# Patient Record
Sex: FEMALE | Race: WHITE
[De-identification: names, ages, dates, MRNs, and addresses within clinical notes are randomized per-mention and may not be internally consistent; named-entity substitution may affect disease eponyms.]

---

## 2020-01-23 ENCOUNTER — HOSPITAL ENCOUNTER (OUTPATIENT)
Dept: HOSPITAL 52 - LL.SDS | Age: 65
Discharge: HOME | End: 2020-01-23
Attending: SURGERY
Payer: MEDICARE

## 2020-01-23 VITALS — SYSTOLIC BLOOD PRESSURE: 157 MMHG | DIASTOLIC BLOOD PRESSURE: 83 MMHG | HEART RATE: 79 BPM

## 2020-01-23 DIAGNOSIS — E78.5: ICD-10-CM

## 2020-01-23 DIAGNOSIS — Z79.84: ICD-10-CM

## 2020-01-23 DIAGNOSIS — K21.9: ICD-10-CM

## 2020-01-23 DIAGNOSIS — Z99.89: ICD-10-CM

## 2020-01-23 DIAGNOSIS — J35.8: Primary | ICD-10-CM

## 2020-01-23 DIAGNOSIS — Z98.890: ICD-10-CM

## 2020-01-23 DIAGNOSIS — F32.9: ICD-10-CM

## 2020-01-23 DIAGNOSIS — F41.9: ICD-10-CM

## 2020-01-23 DIAGNOSIS — Z79.899: ICD-10-CM

## 2020-01-23 DIAGNOSIS — Z88.8: ICD-10-CM

## 2020-01-23 DIAGNOSIS — G47.33: ICD-10-CM

## 2020-01-23 DIAGNOSIS — I10: ICD-10-CM

## 2020-01-23 DIAGNOSIS — E83.42: ICD-10-CM

## 2020-01-23 DIAGNOSIS — E11.9: ICD-10-CM

## 2020-01-23 DIAGNOSIS — I48.91: ICD-10-CM

## 2020-01-23 PROCEDURE — 42826 REMOVAL OF TONSILS: CPT

## 2020-01-23 PROCEDURE — 82962 GLUCOSE BLOOD TEST: CPT

## 2020-01-23 PROCEDURE — 00170 ANES INTRAORAL PX NOS: CPT

## 2020-01-23 NOTE — PCM.HPR
H & P Addendum review





- H & P Addendum Review


Date of Original H & P: 01/21/20


Date Reviewed: 01/23/20


Time Reviewed: 10:22


Patient was Examined: No Changes

## 2020-01-23 NOTE — PCM.OPNOTE
- General Post-Op/Procedure Note


Date of Surgery/Procedure: 01/23/20


Operative Procedure(s): L Tonsillectomy


Findings: 





L Tonsil cyst


Pre Op Diagnosis: L tonsil cyst


Post-Op Diagnosis: Same


Anesthesia Technique: General ET Tube


Primary Surgeon: Thomas J Mohs


Anesthesia Provider: Tiesha VALERO in mLs: 2


Complications: None


Condition: Good

## 2020-01-24 NOTE — OR
Date of Procedure:  01/23/2020

 

PREOPERATIVE DIAGNOSIS:  Left tonsil cyst.

 

POSTOPERATIVE DIAGNOSIS:  Left tonsil cyst.

 

PROCEDURE PERFORMED:  Left tonsillectomy.

 

ANESTHESIA:  General.

 

PROCEDURE IN DETAIL:  Patient was brought to the operating room where general

endotracheal anesthesia was administered.  The oral gag retractor was inserted

and her jaw was tight, which made it difficult to open the retractor and get

good exposure.  I was able to open her mouth enough to be able to see the

hypopharynx and left tonsil cyst at the base of the tonsil.  The left tonsil was

grasped and retracted medially.  Electrocautery was used to dissect along its

muscular plane, removing the tonsil and the cyst without difficulty.  Some

minimal oozing occurred that was easily controlled with electrocautery.  The

right tonsil was normal and was not removed.  The surgical site was inspected

and remained hemostatic after the retractor was released.  The retractor was

then removed and patient extubated and returned to recovery in stable condition.

 

ESTIMATED BLOOD LOSS:  2 mL.

 

MODL THOMAS J MOHS, MD

DD:  01/23/2020 11:20:59

DT:  01/23/2020 15:15:19

Job #:  [id number]

## 2021-05-13 ENCOUNTER — HOSPITAL ENCOUNTER (OUTPATIENT)
Dept: HOSPITAL 52 - LL.SDS | Age: 66
Discharge: HOME | End: 2021-05-13
Attending: SURGERY
Payer: MEDICARE

## 2021-05-13 VITALS — SYSTOLIC BLOOD PRESSURE: 140 MMHG | HEART RATE: 71 BPM | DIASTOLIC BLOOD PRESSURE: 70 MMHG

## 2021-05-13 DIAGNOSIS — Z20.822: ICD-10-CM

## 2021-05-13 DIAGNOSIS — K60.1: Primary | ICD-10-CM

## 2021-05-13 DIAGNOSIS — R92.8: ICD-10-CM

## 2021-05-13 DIAGNOSIS — I10: ICD-10-CM

## 2021-05-13 DIAGNOSIS — I47.1: ICD-10-CM

## 2021-05-13 DIAGNOSIS — N39.0: ICD-10-CM

## 2021-05-13 DIAGNOSIS — E66.01: ICD-10-CM

## 2021-05-13 DIAGNOSIS — E11.9: ICD-10-CM

## 2021-05-13 PROCEDURE — U0002 COVID-19 LAB TEST NON-CDC: HCPCS

## 2021-05-13 NOTE — PCM.HPR
H & P Addendum review





- H & P Addendum Review


Date of Original H & P: 04/30/21


Date Reviewed: 05/13/21


Time Reviewed: 08:59


Patient was Examined: No Changes

## 2021-05-13 NOTE — PCM.OPNOTE
- General Post-Op/Procedure Note


Date of Surgery/Procedure: 05/13/21


Operative Procedure(s): Colonoscopy


Findings: 





Chronic Anal Fissure


Pre Op Diagnosis: Screening


Post-Op Diagnosis: Same


Primary Surgeon: Thomas J Mohs


Anesthesia Provider: Tiesha VALERO in mLs: 0


Complications: None


Condition: Good

## 2021-05-14 NOTE — OR
Date of Procedure:  05/13/2021

 

PREOPERATIVE DIAGNOSIS:  Colon screening.

 

POSTOPERATIVE DIAGNOSIS:  Chronic anal fissure.

 

PROCEDURE:  Colonoscopy.

 

ANESTHESIA:  IV sedation.

 

PROCEDURE IN DETAIL:  Patient was brought to the procedure room where she was

placed on her left side and IV sedation administered.  Digital rectal exam was

performed, which was normal.  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

posterior midline without active bleeding.  The colonoscope was inserted and

advanced to the level of the cecum without difficulty.  Cecal position was

confirmed by identifying the appendiceal lumen and ileocecal valve.  Prep was

good and surfaces were well visualized.  Upon withdrawing the scope, the

ascending, transverse, and descending colon were normal in appearance.  Sigmoid

colon and rectum were normal.  Retroflexion was normal.  Air was removed and the

scope withdrawn.  Patient tolerated the procedure well and returned to recovery

in stable condition.  No further colon screening is necessary due to age.

 

MODL THOMAS J MOHS, MD

DD:  05/13/2021 09:39:33

DT:  05/13/2021 13:52:23

Job #:  [id number]

## 2022-05-12 ENCOUNTER — HOSPITAL ENCOUNTER (OUTPATIENT)
Dept: HOSPITAL 52 - LL.SDS | Age: 67
Discharge: HOME | End: 2022-05-12
Attending: SURGERY
Payer: MEDICARE

## 2022-05-12 VITALS — SYSTOLIC BLOOD PRESSURE: 113 MMHG | HEART RATE: 65 BPM | DIASTOLIC BLOOD PRESSURE: 58 MMHG

## 2022-05-12 DIAGNOSIS — Z98.890: ICD-10-CM

## 2022-05-12 DIAGNOSIS — E78.5: ICD-10-CM

## 2022-05-12 DIAGNOSIS — F41.9: ICD-10-CM

## 2022-05-12 DIAGNOSIS — K52.831: Primary | ICD-10-CM

## 2022-05-12 DIAGNOSIS — F32.A: ICD-10-CM

## 2022-05-12 DIAGNOSIS — K31.7: ICD-10-CM

## 2022-05-12 DIAGNOSIS — Z79.82: ICD-10-CM

## 2022-05-12 DIAGNOSIS — Z79.899: ICD-10-CM

## 2022-05-12 DIAGNOSIS — K31.89: ICD-10-CM

## 2022-05-12 DIAGNOSIS — E11.9: ICD-10-CM

## 2022-05-12 DIAGNOSIS — K29.50: ICD-10-CM

## 2022-05-12 DIAGNOSIS — Z79.84: ICD-10-CM

## 2022-05-12 DIAGNOSIS — K57.30: ICD-10-CM

## 2022-05-12 DIAGNOSIS — K44.9: ICD-10-CM

## 2022-05-12 DIAGNOSIS — I48.0: ICD-10-CM

## 2022-05-12 DIAGNOSIS — G47.30: ICD-10-CM

## 2022-05-12 PROCEDURE — 43239 EGD BIOPSY SINGLE/MULTIPLE: CPT

## 2022-05-12 PROCEDURE — 45380 COLONOSCOPY AND BIOPSY: CPT
